# Patient Record
Sex: MALE | Race: WHITE | NOT HISPANIC OR LATINO | Employment: FULL TIME | URBAN - METROPOLITAN AREA
[De-identification: names, ages, dates, MRNs, and addresses within clinical notes are randomized per-mention and may not be internally consistent; named-entity substitution may affect disease eponyms.]

---

## 2018-02-01 ENCOUNTER — COMMUNICATION - HEALTHEAST (OUTPATIENT)
Dept: INTERNAL MEDICINE | Facility: CLINIC | Age: 38
End: 2018-02-01

## 2018-02-02 ENCOUNTER — OFFICE VISIT - HEALTHEAST (OUTPATIENT)
Dept: INTERNAL MEDICINE | Facility: CLINIC | Age: 38
End: 2018-02-02

## 2018-02-02 ENCOUNTER — AMBULATORY - HEALTHEAST (OUTPATIENT)
Dept: INTERNAL MEDICINE | Facility: CLINIC | Age: 38
End: 2018-02-02

## 2018-02-02 DIAGNOSIS — Z00.00 ROUTINE GENERAL MEDICAL EXAMINATION AT A HEALTH CARE FACILITY: ICD-10-CM

## 2018-02-02 DIAGNOSIS — R79.89 ABNORMAL LFTS: ICD-10-CM

## 2018-02-02 LAB
ALBUMIN SERPL-MCNC: 4.4 G/DL (ref 3.5–5)
ALBUMIN UR-MCNC: NEGATIVE MG/DL
ALP SERPL-CCNC: 44 U/L (ref 45–120)
ALT SERPL W P-5'-P-CCNC: 64 U/L (ref 0–45)
ANION GAP SERPL CALCULATED.3IONS-SCNC: 11 MMOL/L (ref 5–18)
APPEARANCE UR: CLEAR
AST SERPL W P-5'-P-CCNC: 144 U/L (ref 0–40)
BILIRUB SERPL-MCNC: 1.7 MG/DL (ref 0–1)
BILIRUB UR QL STRIP: NEGATIVE
BUN SERPL-MCNC: 11 MG/DL (ref 8–22)
CALCIUM SERPL-MCNC: 9.5 MG/DL (ref 8.5–10.5)
CHLORIDE BLD-SCNC: 104 MMOL/L (ref 98–107)
CHOLEST SERPL-MCNC: 149 MG/DL
CO2 SERPL-SCNC: 25 MMOL/L (ref 22–31)
COLOR UR AUTO: YELLOW
CREAT SERPL-MCNC: 0.96 MG/DL (ref 0.7–1.3)
ERYTHROCYTE [DISTWIDTH] IN BLOOD BY AUTOMATED COUNT: 12.2 % (ref 11–14.5)
FASTING STATUS PATIENT QL REPORTED: NORMAL
GFR SERPL CREATININE-BSD FRML MDRD: >60 ML/MIN/1.73M2
GLUCOSE BLD-MCNC: 92 MG/DL (ref 70–125)
GLUCOSE UR STRIP-MCNC: NEGATIVE MG/DL
HCT VFR BLD AUTO: 43 % (ref 40–54)
HDLC SERPL-MCNC: 83 MG/DL
HGB BLD-MCNC: 14.2 G/DL (ref 14–18)
HGB UR QL STRIP: NEGATIVE
KETONES UR STRIP-MCNC: NEGATIVE MG/DL
LDLC SERPL CALC-MCNC: 59 MG/DL
LEUKOCYTE ESTERASE UR QL STRIP: NEGATIVE
MCH RBC QN AUTO: 29.6 PG (ref 27–34)
MCHC RBC AUTO-ENTMCNC: 33 G/DL (ref 32–36)
MCV RBC AUTO: 90 FL (ref 80–100)
NITRATE UR QL: NEGATIVE
PH UR STRIP: 7.5 [PH] (ref 5–8)
PLATELET # BLD AUTO: 180 THOU/UL (ref 140–440)
PMV BLD AUTO: 7 FL (ref 7–10)
POTASSIUM BLD-SCNC: 4.2 MMOL/L (ref 3.5–5)
PROT SERPL-MCNC: 7.6 G/DL (ref 6–8)
PSA SERPL-MCNC: 0.8 NG/ML (ref 0–2.5)
RBC # BLD AUTO: 4.8 MILL/UL (ref 4.4–6.2)
SODIUM SERPL-SCNC: 140 MMOL/L (ref 136–145)
SP GR UR STRIP: 1.01 (ref 1–1.03)
TRIGL SERPL-MCNC: 37 MG/DL
TSH SERPL DL<=0.005 MIU/L-ACNC: 1.31 UIU/ML (ref 0.3–5)
UROBILINOGEN UR STRIP-ACNC: NORMAL
WBC: 5.3 THOU/UL (ref 4–11)

## 2018-02-02 ASSESSMENT — MIFFLIN-ST. JEOR: SCORE: 1766.49

## 2018-02-05 ENCOUNTER — COMMUNICATION - HEALTHEAST (OUTPATIENT)
Dept: INTERNAL MEDICINE | Facility: CLINIC | Age: 38
End: 2018-02-05

## 2018-02-06 ENCOUNTER — HOSPITAL ENCOUNTER (OUTPATIENT)
Dept: ULTRASOUND IMAGING | Facility: CLINIC | Age: 38
Discharge: HOME | End: 2018-02-06
Attending: INTERNAL MEDICINE

## 2018-02-06 DIAGNOSIS — R79.89 OTHER SPECIFIED ABNORMAL FINDINGS OF BLOOD CHEMISTRY: ICD-10-CM

## 2018-02-06 DIAGNOSIS — R79.89 ABNORMAL LFTS: ICD-10-CM

## 2018-02-27 ENCOUNTER — OFFICE VISIT - HEALTHEAST (OUTPATIENT)
Dept: INTERNAL MEDICINE | Facility: CLINIC | Age: 38
End: 2018-02-27

## 2018-02-27 DIAGNOSIS — R79.89 ABNORMAL LIVER FUNCTION TESTS: ICD-10-CM

## 2018-02-27 LAB
ALBUMIN SERPL-MCNC: 4.5 G/DL (ref 3.5–5)
ALP SERPL-CCNC: 46 U/L (ref 45–120)
ALT SERPL W P-5'-P-CCNC: 36 U/L (ref 0–45)
AST SERPL W P-5'-P-CCNC: 44 U/L (ref 0–40)
BILIRUB DIRECT SERPL-MCNC: 0.6 MG/DL
BILIRUB SERPL-MCNC: 2.4 MG/DL (ref 0–1)
PROT SERPL-MCNC: 7.5 G/DL (ref 6–8)

## 2018-02-27 ASSESSMENT — MIFFLIN-ST. JEOR: SCORE: 1766.49

## 2018-02-28 LAB
HAV IGM SERPL QL IA: NEGATIVE
HBV CORE IGM SERPL QL IA: NEGATIVE
HBV SURFACE AG SERPL QL IA: NEGATIVE
HCV AB SERPL QL IA: NEGATIVE

## 2018-03-01 ENCOUNTER — COMMUNICATION - HEALTHEAST (OUTPATIENT)
Dept: INTERNAL MEDICINE | Facility: CLINIC | Age: 38
End: 2018-03-01

## 2018-06-25 ENCOUNTER — OFFICE VISIT - HEALTHEAST (OUTPATIENT)
Dept: INTERNAL MEDICINE | Facility: CLINIC | Age: 38
End: 2018-06-25

## 2018-06-25 DIAGNOSIS — R79.89 ABNORMAL LIVER FUNCTION TESTS: ICD-10-CM

## 2018-06-25 LAB
ALBUMIN SERPL-MCNC: 4.3 G/DL (ref 3.5–5)
ALP SERPL-CCNC: 46 U/L (ref 45–120)
ALT SERPL W P-5'-P-CCNC: 14 U/L (ref 0–45)
AST SERPL W P-5'-P-CCNC: 16 U/L (ref 0–40)
BILIRUB DIRECT SERPL-MCNC: 0.5 MG/DL
BILIRUB SERPL-MCNC: 1.8 MG/DL (ref 0–1)
PROT SERPL-MCNC: 7.3 G/DL (ref 6–8)

## 2018-06-25 ASSESSMENT — MIFFLIN-ST. JEOR: SCORE: 1757.41

## 2018-06-26 ENCOUNTER — COMMUNICATION - HEALTHEAST (OUTPATIENT)
Dept: INTERNAL MEDICINE | Facility: CLINIC | Age: 38
End: 2018-06-26

## 2018-11-26 ENCOUNTER — RECORDS - HEALTHEAST (OUTPATIENT)
Dept: ADMINISTRATIVE | Facility: OTHER | Age: 38
End: 2018-11-26

## 2018-12-27 ENCOUNTER — OFFICE VISIT - HEALTHEAST (OUTPATIENT)
Dept: INTERNAL MEDICINE | Facility: CLINIC | Age: 38
End: 2018-12-27

## 2018-12-27 DIAGNOSIS — R10.84 ABDOMINAL PAIN, GENERALIZED: ICD-10-CM

## 2018-12-27 LAB
ALBUMIN SERPL-MCNC: 4.2 G/DL (ref 3.5–5)
ALBUMIN UR-MCNC: NEGATIVE MG/DL
ALP SERPL-CCNC: 56 U/L (ref 45–120)
ALT SERPL W P-5'-P-CCNC: 18 U/L (ref 0–45)
ANION GAP SERPL CALCULATED.3IONS-SCNC: 10 MMOL/L (ref 5–18)
APPEARANCE UR: CLEAR
AST SERPL W P-5'-P-CCNC: 16 U/L (ref 0–40)
BILIRUB DIRECT SERPL-MCNC: 0.2 MG/DL
BILIRUB SERPL-MCNC: 0.7 MG/DL (ref 0–1)
BILIRUB UR QL STRIP: NEGATIVE
BUN SERPL-MCNC: 15 MG/DL (ref 8–22)
CALCIUM SERPL-MCNC: 9.4 MG/DL (ref 8.5–10.5)
CHLORIDE BLD-SCNC: 106 MMOL/L (ref 98–107)
CO2 SERPL-SCNC: 27 MMOL/L (ref 22–31)
COLOR UR AUTO: YELLOW
CREAT SERPL-MCNC: 0.98 MG/DL (ref 0.7–1.3)
ERYTHROCYTE [DISTWIDTH] IN BLOOD BY AUTOMATED COUNT: 12.9 % (ref 11–14.5)
GFR SERPL CREATININE-BSD FRML MDRD: >60 ML/MIN/1.73M2
GLUCOSE BLD-MCNC: 92 MG/DL (ref 70–125)
GLUCOSE UR STRIP-MCNC: NEGATIVE MG/DL
HCT VFR BLD AUTO: 43.4 % (ref 40–54)
HGB BLD-MCNC: 14.5 G/DL (ref 14–18)
HGB UR QL STRIP: NEGATIVE
KETONES UR STRIP-MCNC: NEGATIVE MG/DL
LEUKOCYTE ESTERASE UR QL STRIP: NEGATIVE
LIPASE SERPL-CCNC: 15 U/L (ref 0–52)
MCH RBC QN AUTO: 30.1 PG (ref 27–34)
MCHC RBC AUTO-ENTMCNC: 33.4 G/DL (ref 32–36)
MCV RBC AUTO: 90 FL (ref 80–100)
NITRATE UR QL: NEGATIVE
PH UR STRIP: 5.5 [PH] (ref 5–8)
PLATELET # BLD AUTO: 238 THOU/UL (ref 140–440)
PMV BLD AUTO: 6.9 FL (ref 7–10)
POTASSIUM BLD-SCNC: 4.2 MMOL/L (ref 3.5–5)
PROT SERPL-MCNC: 7.1 G/DL (ref 6–8)
RBC # BLD AUTO: 4.83 MILL/UL (ref 4.4–6.2)
SODIUM SERPL-SCNC: 143 MMOL/L (ref 136–145)
SP GR UR STRIP: >=1.03 (ref 1–1.03)
UROBILINOGEN UR STRIP-ACNC: NORMAL
WBC: 5.3 THOU/UL (ref 4–11)

## 2018-12-27 ASSESSMENT — MIFFLIN-ST. JEOR: SCORE: 1766.49

## 2018-12-28 ENCOUNTER — HOSPITAL ENCOUNTER (OUTPATIENT)
Dept: CT IMAGING | Facility: CLINIC | Age: 38
Discharge: HOME | End: 2018-12-28
Attending: INTERNAL MEDICINE

## 2018-12-28 ENCOUNTER — COMMUNICATION - HEALTHEAST (OUTPATIENT)
Dept: INTERNAL MEDICINE | Facility: CLINIC | Age: 38
End: 2018-12-28

## 2018-12-28 DIAGNOSIS — R10.84 ABDOMINAL PAIN, GENERALIZED: ICD-10-CM

## 2019-01-18 ENCOUNTER — RECORDS - HEALTHEAST (OUTPATIENT)
Dept: ADMINISTRATIVE | Facility: OTHER | Age: 39
End: 2019-01-18

## 2019-01-25 ENCOUNTER — RECORDS - HEALTHEAST (OUTPATIENT)
Dept: ADMINISTRATIVE | Facility: OTHER | Age: 39
End: 2019-01-25

## 2019-04-08 ENCOUNTER — RECORDS - HEALTHEAST (OUTPATIENT)
Dept: ADMINISTRATIVE | Facility: OTHER | Age: 39
End: 2019-04-08

## 2019-04-25 ENCOUNTER — RECORDS - HEALTHEAST (OUTPATIENT)
Dept: ADMINISTRATIVE | Facility: OTHER | Age: 39
End: 2019-04-25

## 2021-06-01 VITALS — WEIGHT: 182 LBS | HEIGHT: 73 IN | BODY MASS INDEX: 24.12 KG/M2

## 2021-06-01 VITALS — BODY MASS INDEX: 24.12 KG/M2 | WEIGHT: 182 LBS | HEIGHT: 73 IN

## 2021-06-01 VITALS — HEIGHT: 73 IN | BODY MASS INDEX: 23.86 KG/M2 | WEIGHT: 180 LBS

## 2021-06-02 VITALS — BODY MASS INDEX: 24.12 KG/M2 | HEIGHT: 73 IN | WEIGHT: 182 LBS

## 2021-06-15 NOTE — PROGRESS NOTES
Office Visit - Physical    Hans Abel   37 y.o. male    Date of Visit: 2/2/2018    Chief Complaint   Patient presents with     Annual Exam     fasting     Cyst     back of neck     Nevus       Subjective: Physical.    37-year-old male here for physical exam.  Cyst on neck reassured suggest Derm consult nevus another skin exams reviewed check with dermatology.    ROS: A comprehensive review of systems was performed and was otherwise negative    Medications:   Prior to Admission medications    Medication Sig Start Date End Date Taking? Authorizing Provider   penicillin VK (PEN VK) 500 MG tablet TAKE 1 TABLET BY MOUTH 2 TIMES A DAY FOR 10 DAYS. 1/23/18   PROVIDER, HISTORICAL       Allergies:  Allergies   Allergen Reactions     Cat Hair Std Allergenic Ext      Pollen        Immunizations:   Immunization History   Administered Date(s) Administered     Td,adult,historic,unspecified 07/26/2012     Tdap 07/26/2012       Health Maintenance: Non-smoker no excess alcohol social averaging 2 drinks per night.  Allergic to cat hair no other known drug allergies.    Immunizations reviewed and up-to-date.    Past Medical History: None 2 courses of penicillin this month for strep infection.  In the throat  Past Surgical History: Ear tubes and wisdom teeth extractions    Family History: Mother and dad both living mother age 66 with polymyalgia rheumatica.  Father living age 69 with osteoarthritis.  One sister age 37 with 3 children herself the patient himself with his wife has 2 children.   twice  once.    Social History: [employer]  Enjoys playing golf recently played at [employer] with his family.  Exam Chest clear to auscultation and percussion.  Heart tones regular rhythm without murmur rub or gallop.  Abdomen soft nontender no organomegaly.  No peritoneal signs.  Extremities free of edema cyanosis or clubbing.  Neck veins nondistended no thyromegaly or scleral icterus noted, carotids full.  Skin warm  and dry easily conversant good spirited.  Normal intelligence.  Neurologically intact no gross localizing findings.  Skin negative lymph negative neuro negative psych normal HEENT negative back straight no severe spine tenderness general rectal exam negative good pulses noted in all 4 extremities no carotid bruits or thyromegaly.    Assessment and Plan  General medical examination at healthcare facility needs dermatology consult.  Check hemogram comprehensive metabolic profile lipid panel PSA TSH urinalysis.    Allergy to cat hair and pollen.    Recent 2 courses of penicillin by mouth for strep pharyngitis.        Edwardo Cannon MD    Patient Active Problem List   Diagnosis     Hyperlipidemia     Influenza     Muscle Aches, Generalized (Myalgias)     Fever (Symptom)

## 2021-06-16 NOTE — PROGRESS NOTES
Office Visit - Follow up    Hans Abel   37 y.o. male    Date of Visit: 2/27/2018    Chief Complaint   Patient presents with     Follow-up     f/u elevated liver enzymes       Subjective: Abnormal liver function tests.  Patient drinks alcohol every day.    The recent ultrasound right upper quadrant unremarkable.    Hepatitis B see an acute evaluation is underway with serologic testing.    The patient denies being jaundice or anorectic no nausea or vomiting.  No right upper quadrant tenderness or pain.  No ascitic fluid wave or leg edema.  Or palmar erythema.    No blood in stool or urine medication list reviewed well-tolerated normal effects.    ROS: A comprehensive review of systems was performed and was otherwise negative    Medications:  Prior to Admission medications    Medication Sig Start Date End Date Taking? Authorizing Provider   penicillin VK (PEN VK) 500 MG tablet TAKE 1 TABLET BY MOUTH 2 TIMES A DAY FOR 10 DAYS. 1/23/18 2/27/18  PROVIDER, HISTORICAL       Allergies:   Allergies   Allergen Reactions     Cat Hair Std Allergenic Ext      Pollen        Immunizations:   Immunization History   Administered Date(s) Administered     Td,adult,historic,unspecified 07/26/2012     Tdap 07/26/2012       Exam Chest clear to auscultation and percussion.  Heart tones regular rhythm without murmur rub or gallop.  Abdomen soft nontender no organomegaly.  No peritoneal signs.  Extremities free of edema cyanosis or clubbing.  Neck veins nondistended no thyromegaly or scleral icterus noted, carotids full.  Skin warm and dry easily conversant good spirited.  Normal intelligence.  Neurologically intact no gross localizing findings.    No ascitic fluid wave demonstrated no parotid swelling no palmar erythema or telangiectasias.      Assessment and Plan  Abnormal liver function tests uncertain etiology repeat hepatic profile plus hepatitis BC and acute serology evaluation.    Cat hair and pollen allergies.    Time: total  time spent with the patient was 15 minutes of which >50% was spent in counseling and coordination of care    The following high BMI interventions were performed this visit: encouragement to exercise    Edwardo Cannon MD    Patient Active Problem List   Diagnosis     Hyperlipidemia     Influenza     Muscle Aches, Generalized (Myalgias)     Fever (Symptom)

## 2021-06-18 NOTE — PROGRESS NOTES
Office Visit - Follow up    Hans Abel   38 y.o. male    Date of Visit: 6/25/2018    Chief Complaint   Patient presents with     Follow-up     liver function tests       Subjective: Abnormal liver function tests.    Alcohol intake is quantitatively less and qualitatively different.  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    And less of it totally.    No abdominal pain no fluid in the abdomen or legs.  She he does not notice any dark cola colored urine or kindra colored stools.    No blood in stool or urine medication list reviewed on a supplement to aid liver.    Allergy cat hair and pollen.  Seasonal.    One child had strep infection 2 weeks ago.  Wonders if he is a carrier.  Offered rapid strep test of the throat for this patient he declined.    ROS: A comprehensive review of systems was performed and was otherwise negative    Medications:  Prior to Admission medications    Not on File       Allergies:   Allergies   Allergen Reactions     Cat Hair Std Allergenic Ext      Pollen        Immunizations:   Immunization History   Administered Date(s) Administered     Td,adult,historic,unspecified 07/26/2012     Tdap 07/26/2012       Exam Chest clear to auscultation and percussion.  Heart tones regular rhythm without murmur rub or gallop.  Abdomen soft nontender no organomegaly.  No peritoneal signs.  Extremities free of edema cyanosis or clubbing.  Neck veins nondistended no thyromegaly or scleral icterus noted, carotids full.  Skin warm and dry easily conversant good spirited.  Normal intelligence.  Neurologically intact no gross localizing findings.  Throat negative minimal redness no exudate no anterior posterior cervical adenopathy no thyromegaly.  He appeared well nonicteric.  Easily conversant.  Prior history of elevated bilirubin level noted.  May not be related to abnormal liver function tests related to alcohol.  May be  Gilbert's syndrome?    Assessment and Plan  Gilbert syndrome with abnormal liver function tests; check hepatic profile today.  Seasonal allergies cat hair and pollen.    Alcohol intake may be excess discussed.  Encourage patient to use less alcohol.    Hyperlipidemia by history no history of target organ damage related to same that is no history of MI or stroke.    Family history recently positive for strep infection oropharynx minimally red no exudate no anterior posterior cervical adenopathy offered rapid strep test of the throat patient declined.    Time: total time spent with the patient was 25 minutes of which >50% was spent in counseling and coordination of care        Edwardo Cannon MD    Patient Active Problem List   Diagnosis     Hyperlipidemia     Influenza     Muscle Aches, Generalized (Myalgias)     Fever (Symptom)

## 2021-06-22 NOTE — PROGRESS NOTES
Office Visit - Follow up    Hans Abel   38 y.o. male    Date of Visit: 12/27/2018    Chief Complaint   Patient presents with     Follow-up     abnormal liver function tests     Pain     right abdomen and right lower back off and on for one month     Gas       Subjective: Abdominal pain.    Right side of abdomen left side of abdomen feels like there is bubbling in his bowel.    Gas and stool was pasty for 2 weeks follow-up abnormal liver profile as well.  Prior history of excess alcohol intake.    Symptoms started December 1, 2018.  Change in bowel movement change in consistency better with Metamucil.  The patient had emesis the patient had a stomach flu with diarrhea.  Wonder about irritable bowel syndrome.    No blood in stool or urine weight is stable.  No nocturnal diarrhea dictation list reviewed well-tolerated normal effects.    ROS: A comprehensive review of systems was performed and was otherwise negative    Medications:  Prior to Admission medications    Medication Sig Start Date End Date Taking? Authorizing Provider   psyllium with dextrose (METAMUCIL JAR) powder Take by mouth 3 (three) times a day.   Yes PROVIDER, HISTORICAL       Allergies:   Allergies   Allergen Reactions     Cat Hair Std Allergenic Ext      Pollen        Immunizations:   Immunization History   Administered Date(s) Administered     Td,adult,historic,unspecified 07/26/2012     Tdap 07/26/2012       Exam Chest clear to auscultation and percussion.  Heart tones regular rhythm without murmur rub or gallop.  Abdomen soft nontender no organomegaly.  No peritoneal signs.  Extremities free of edema cyanosis or clubbing.  Neck veins nondistended no thyromegaly or scleral icterus noted, carotids full.  Skin warm and dry easily conversant good spirited.  Normal intelligence.  Neurologically intact no gross localizing findings.  Not in acute distress easily conversant BMI is ideal at 24.  Weight is up 2 pounds 182.  116/68 pulse 74  respirations 18 O2 sats 100% on room air.    Allergies cat hair and pollen no regular meds.    Assessment and Plan  Abdominal pain uncertain etiology check hemogram plus hepatic profile basic metabolic profile serum lipase urinalysis with urine culture as well as CT of abdomen.    Irritable bowel syndrome.    Viral gastroenteritis better Metamucil helping.    Gilbert syndrome as recent examination through insurance company showed elevated bilirubin level this was explained in detail the difference between unconjugated and conjugated bilirubin as a breakdown product of seem and lack of enzymes with Gilbert syndrome producing conjugated bilirubin for dissolution and water and excretion    Time: total time spent with the patient was 40 minutes of which >50% was spent in counseling and coordination of care    The following high BMI interventions were performed this visit: encouragement to exercise    Edwardo Cannon MD    Patient Active Problem List   Diagnosis     Hyperlipidemia     Influenza     Muscle Aches, Generalized (Myalgias)     Fever (Symptom)

## 2021-06-26 ENCOUNTER — HEALTH MAINTENANCE LETTER (OUTPATIENT)
Age: 41
End: 2021-06-26

## 2021-10-04 ENCOUNTER — TELEPHONE (OUTPATIENT)
Dept: INTERNAL MEDICINE | Facility: CLINIC | Age: 41
End: 2021-10-04

## 2021-10-04 NOTE — TELEPHONE ENCOUNTER
Reason for Call:  Patient calling and wondering which Covid vaccine PCP would recommend he get.  Patient would like to discuss with nurse or PCP    Phone Number Patient can be reached at: Cell number on file:    Telephone Information:   Mobile [phone]       Best Time: anytime    Can we leave a detailed message on this number? YES    Call taken on 10/4/2021 at 10:40 AM by Nicolasa Lawrence

## 2021-10-04 NOTE — TELEPHONE ENCOUNTER
Spoke with patient and he said he will try and text Dr. Cannon. Advised him I would send this message incase he doesn't get a hold of him.   Emma Ortiz CSS

## 2021-10-16 ENCOUNTER — HEALTH MAINTENANCE LETTER (OUTPATIENT)
Age: 41
End: 2021-10-16

## 2021-12-28 ENCOUNTER — OFFICE VISIT (OUTPATIENT)
Dept: INTERNAL MEDICINE | Facility: CLINIC | Age: 41
End: 2021-12-28
Payer: COMMERCIAL

## 2021-12-28 VITALS
SYSTOLIC BLOOD PRESSURE: 122 MMHG | OXYGEN SATURATION: 99 % | BODY MASS INDEX: 25.05 KG/M2 | HEART RATE: 55 BPM | WEIGHT: 189 LBS | DIASTOLIC BLOOD PRESSURE: 76 MMHG | HEIGHT: 73 IN

## 2021-12-28 DIAGNOSIS — Z00.00 ROUTINE GENERAL MEDICAL EXAMINATION AT A HEALTH CARE FACILITY: Primary | ICD-10-CM

## 2021-12-28 DIAGNOSIS — R31.29 MICROSCOPIC HEMATURIA: Primary | ICD-10-CM

## 2021-12-28 LAB
ALBUMIN SERPL-MCNC: 4.3 G/DL (ref 3.5–5)
ALBUMIN UR-MCNC: NEGATIVE MG/DL
ALP SERPL-CCNC: 47 U/L (ref 45–120)
ALT SERPL W P-5'-P-CCNC: 19 U/L (ref 0–45)
ANION GAP SERPL CALCULATED.3IONS-SCNC: 9 MMOL/L (ref 5–18)
APPEARANCE UR: CLEAR
AST SERPL W P-5'-P-CCNC: 19 U/L (ref 0–40)
BACTERIA #/AREA URNS HPF: ABNORMAL /HPF
BILIRUB SERPL-MCNC: 1.1 MG/DL (ref 0–1)
BILIRUB UR QL STRIP: NEGATIVE
BUN SERPL-MCNC: 17 MG/DL (ref 8–22)
CALCIUM SERPL-MCNC: 9.3 MG/DL (ref 8.5–10.5)
CHLORIDE BLD-SCNC: 105 MMOL/L (ref 98–107)
CHOLEST SERPL-MCNC: 162 MG/DL
CO2 SERPL-SCNC: 28 MMOL/L (ref 22–31)
COLOR UR AUTO: YELLOW
CREAT SERPL-MCNC: 1.11 MG/DL (ref 0.7–1.3)
ERYTHROCYTE [DISTWIDTH] IN BLOOD BY AUTOMATED COUNT: 12.5 % (ref 10–15)
FASTING STATUS PATIENT QL REPORTED: YES
GFR SERPL CREATININE-BSD FRML MDRD: 84 ML/MIN/1.73M2
GLUCOSE BLD-MCNC: 98 MG/DL (ref 70–125)
GLUCOSE UR STRIP-MCNC: NEGATIVE MG/DL
HCT VFR BLD AUTO: 42.1 % (ref 40–53)
HDLC SERPL-MCNC: 61 MG/DL
HGB BLD-MCNC: 13.7 G/DL (ref 13.3–17.7)
HGB UR QL STRIP: ABNORMAL
KETONES UR STRIP-MCNC: NEGATIVE MG/DL
LDLC SERPL CALC-MCNC: 88 MG/DL
LEUKOCYTE ESTERASE UR QL STRIP: NEGATIVE
MCH RBC QN AUTO: 29.2 PG (ref 26.5–33)
MCHC RBC AUTO-ENTMCNC: 32.5 G/DL (ref 31.5–36.5)
MCV RBC AUTO: 90 FL (ref 78–100)
MUCOUS THREADS #/AREA URNS LPF: PRESENT /LPF
NITRATE UR QL: NEGATIVE
PH UR STRIP: 5.5 [PH] (ref 5–8)
PLATELET # BLD AUTO: 186 10E3/UL (ref 150–450)
POTASSIUM BLD-SCNC: 4.2 MMOL/L (ref 3.5–5)
PROT SERPL-MCNC: 7.1 G/DL (ref 6–8)
PSA SERPL-MCNC: 0.81 UG/L (ref 0–2.5)
RBC # BLD AUTO: 4.69 10E6/UL (ref 4.4–5.9)
RBC #/AREA URNS AUTO: ABNORMAL /HPF
SODIUM SERPL-SCNC: 142 MMOL/L (ref 136–145)
SP GR UR STRIP: >=1.03 (ref 1–1.03)
SQUAMOUS #/AREA URNS AUTO: ABNORMAL /LPF
TRIGL SERPL-MCNC: 67 MG/DL
TSH SERPL DL<=0.005 MIU/L-ACNC: 2.15 UIU/ML (ref 0.3–5)
UROBILINOGEN UR STRIP-ACNC: 0.2 E.U./DL
WBC # BLD AUTO: 4 10E3/UL (ref 4–11)
WBC #/AREA URNS AUTO: ABNORMAL /HPF

## 2021-12-28 PROCEDURE — 99386 PREV VISIT NEW AGE 40-64: CPT | Performed by: INTERNAL MEDICINE

## 2021-12-28 PROCEDURE — 80050 GENERAL HEALTH PANEL: CPT | Performed by: INTERNAL MEDICINE

## 2021-12-28 PROCEDURE — G0103 PSA SCREENING: HCPCS | Performed by: INTERNAL MEDICINE

## 2021-12-28 PROCEDURE — 81001 URINALYSIS AUTO W/SCOPE: CPT | Performed by: INTERNAL MEDICINE

## 2021-12-28 PROCEDURE — 80061 LIPID PANEL: CPT | Performed by: INTERNAL MEDICINE

## 2021-12-28 PROCEDURE — 36415 COLL VENOUS BLD VENIPUNCTURE: CPT | Performed by: INTERNAL MEDICINE

## 2021-12-28 ASSESSMENT — MIFFLIN-ST. JEOR: SCORE: 1802.21

## 2021-12-28 NOTE — PROGRESS NOTES
SUBJECTIVE:   CC: Hans Abel is an 41 year old male who presents for preventative health visit.     [id number]}  Patient has been advised of split billing requirements and indicates understanding: Yes  Healthy Habits:     Getting at least 3 servings of Calcium per day:  NO    Bi-annual eye exam:  NO    Dental care twice a year:  NO    Sleep apnea or symptoms of sleep apnea:  Daytime drowsiness    Diet:  Regular (no restrictions)    Frequency of exercise:  1 day/week    Duration of exercise:  Less than 15 minutes    Taking medications regularly:  Not Applicable    Medication side effects:  Not applicable    PHQ-2 Total Score: 0    Additional concerns today:  Yes      {Outside tests to abstract? :439264}    {additional problems to add (Optional):205277}    Today's PHQ-2 Score:   PHQ-2 ( 1999 Pfizer) 12/27/2021   Q1: Little interest or pleasure in doing things 0   Q2: Feeling down, depressed or hopeless 0   PHQ-2 Score 0   Q1: Little interest or pleasure in doing things Not at all   Q2: Feeling down, depressed or hopeless Not at all   PHQ-2 Score 0       Abuse: Current or Past(Physical, Sexual or Emotional)- { :431580}  Do you feel safe in your environment? { :206355}    Have you ever done Advance Care Planning? (For example, a Health Directive, POLST, or a discussion with a medical provider or your loved ones about your wishes): { :316199}    Social History     Tobacco Use     Smoking status: Never Smoker     Smokeless tobacco: Never Used   Substance Use Topics     Alcohol use: Yes     Alcohol/week: 6.0 standard drinks     {Rooming Staff- Complete this question if Prescreen response is not shown below for today's visit. If you drink alcohol do you typically have >3 drinks per day or >7 drinks per week? (Optional):203545}    Alcohol Use 12/27/2021   Prescreen: >3 drinks/day or >7 drinks/week? Yes   AUDIT SCORE  9   {add AUDIT responses (Optional) (A score of 7 for adult men is an indication of hazardous drinking; a  "score of 8 or more is an indication of an alcohol use disorder.  A score of 7 or more for adult women is an indication of hazardous drinking or an alchohol use disorder):876813}    Last PSA:   Prostate Specific Antigen Screen   Date Value Ref Range Status   02/02/2018 0.8 0.0 - 2.5 ng/mL Final       Reviewed orders with patient. Reviewed health maintenance and updated orders accordingly - { :794311::\"Yes\"}  {Chronicprobdata (optional):590889}    Reviewed and updated as needed this visit by clinical staff  Tobacco  Allergies              Reviewed and updated as needed this visit by Provider               {HISTORY OPTIONS (Optional):744071}    Review of Systems  {MALE ROS (Optional):987087::\"CONSTITUTIONAL: NEGATIVE for fever, chills, change in weight\",\"INTEGUMENTARY/SKIN: NEGATIVE for worrisome rashes, moles or lesions\",\"EYES: NEGATIVE for vision changes or irritation\",\"ENT: NEGATIVE for ear, mouth and throat problems\",\"RESP: NEGATIVE for significant cough or SOB\",\"CV: NEGATIVE for chest pain, palpitations or peripheral edema\",\"GI: NEGATIVE for nausea, abdominal pain, heartburn, or change in bowel habits\",\" male: negative for dysuria, hematuria, decreased urinary stream, erectile dysfunction, urethral discharge\",\"MUSCULOSKELETAL: NEGATIVE for significant arthralgias or myalgia\",\"NEURO: NEGATIVE for weakness, dizziness or paresthesias\",\"PSYCHIATRIC: NEGATIVE for changes in mood or affect\"}    OBJECTIVE:   /76 (BP Location: Right arm, Patient Position: Sitting)   Pulse 55   Ht 1.848 m (6' 0.75\")   Wt 85.7 kg (189 lb)   SpO2 99%   BMI 25.11 kg/m      Physical Exam  {Exam Choices (Optional):593161}    {Diagnostic Test Results (Optional):944237::\"Diagnostic Test Results:\",\"Labs reviewed in Epic\"}    ASSESSMENT/PLAN:   {Diag Picklist:392984}    Patient has been advised of split billing requirements and indicates understanding: {YES / NO:638363::\"Yes\"}  COUNSELING:   {MALE COUNSELING " "MESSAGES:426594::\"Reviewed preventive health counseling, as reflected in patient instructions\"}    Estimated body mass index is 25.11 kg/m  as calculated from the following:    Height as of this encounter: 1.848 m (6' 0.75\").    Weight as of this encounter: 85.7 kg (189 lb).     {Weight Management Plan (ACO) Complete if BMI is abnormal-  Ages 18-64  BMI >24.9.  Age 65+ with BMI <23 or >30 (Optional):103519}    He reports that he has never smoked. He has never used smokeless tobacco.      Counseling Resources:  ATP IV Guidelines  Pooled Cohorts Equation Calculator  FRAX Risk Assessment  ICSI Preventive Guidelines  Dietary Guidelines for Americans, 2010  USDA's MyPlate  ASA Prophylaxis  Lung CA Screening    Edwardo Cannon MD  [facility]  "

## 2021-12-28 NOTE — PROGRESS NOTES
Office Visit - Physical    Hans Abel   41 year old male    Date of Visit: 12/28/2021    Chief Complaint   Patient presents with     Physical     fasting       Subjective: Physical examination is.  41-year-old male here for physical examination.  Non-smoker alcohol intake is social plus allergies none.    Uses Metamucil on occasion.  Has had a colonoscopy 2 or 3 years ago negative.    No other serious illnesses other than in April 2021 he had Covid illness the patient was advised to get the Covid vaccine the patient has yet to do so.  The patient's wife is well as are his 2 children colonoscopy date is April 25, 2019 allCLEAR.  Patient has had some discomfort along his right rib cage and lower back likely musculoskeletal.  Enjoys playing golf.    ROS: A comprehensive review of systems was performed and was otherwise negative    Medications:   Prior to Admission medications    Medication Sig Start Date End Date Taking? Authorizing Provider   psyllium with dextrose (METAMUCIL JAR) powder Take by mouth daily  12/27/18  Yes Provider, Historical       Allergies:  Allergies   Allergen Reactions     Cat Hair Std Allergenic Ext [Cat Hair Extract] Unknown     Pollen [Pollen Extract] Unknown       Immunizations:   Immunization History   Administered Date(s) Administered     Td,adult,historic,unspecified 07/26/2012     Tdap (Adacel,Boostrix) 07/26/2012       Health Maintenance: Immunizations are yet to be completed the patient has had the usual childhood vaccines but has yet had the Covid vaccine I did recommend that he receive it.  In April 2021 for the COVID-19 illness.    Past Medical History: Previously used Metamucil.    Past Surgical History: Colonoscopy dated April 25, 2019 with Dr. Thomas newell [facility].    Family History: Mother and father both living in their early 70s.  Mother with polymyalgia rheumatica father with arthritis generalized and uveitis.  2 children well wife well.   twice   Prescription sent to the patient's pharmacy.   once.    Social History: Works in [employer].    Exam Chest clear to auscultation and percussion.  Heart tones regular rhythm without murmur rub or gallop.  Abdomen soft nontender no organomegaly.  No peritoneal signs.  Extremities free of edema cyanosis or clubbing.  Neck veins nondistended no thyromegaly or scleral icterus noted, carotids full.  Skin warm and dry easily conversant good spirited.  Normal intelligence.  Neurologically intact no gross localizing findings.  Rest of examination was negative in its entirety genital rectal exam normal prostate small lymph negative neuro negative skin negative HEENT negative wearing a mask easily conversant good spirited highly intelligent not in acute distress not toxic.    Assessment and Plan  General medical examination at health care facility discussed habits in detail today check hemogram comprehensive metabolic profile urinalysis lipid panel PSA TSH.        Edwardo Cannon MD, MD    Patient Active Problem List   Diagnosis     Hyperlipidemia     Influenza     Muscle Aches, Generalized (Myalgias)     Fever (Symptom)     Answers for HPI/ROS submitted by the patient on 12/27/2021  Frequency of exercise:: 1 day/week  Getting at least 3 servings of Calcium per day:: NO  Diet:: Regular (no restrictions)  Taking medications regularly:: Not Applicable  Medication side effects:: Not applicable  Bi-annual eye exam:: NO  Dental care twice a year:: NO  Sleep apnea or symptoms of sleep apnea:: Daytime drowsiness  Additional concerns today:: Yes  Duration of exercise:: Less than 15 minutes

## 2021-12-28 NOTE — LETTER
December 30, 2021      Hans Abel  [address]  [place] [address]        Dear DezColten,    We are writing to inform you of your test results.    All very good!    Resulted Orders   CBC with platelets   Result Value Ref Range    WBC Count 4.0 4.0 - 11.0 10e3/uL    RBC Count 4.69 4.40 - 5.90 10e6/uL    Hemoglobin 13.7 13.3 - 17.7 g/dL    Hematocrit 42.1 40.0 - 53.0 %    MCV 90 78 - 100 fL    MCH 29.2 26.5 - 33.0 pg    MCHC 32.5 31.5 - 36.5 g/dL    RDW 12.5 10.0 - 15.0 %    Platelet Count 186 150 - 450 10e3/uL   Comprehensive metabolic panel   Result Value Ref Range    Sodium 142 136 - 145 mmol/L    Potassium 4.2 3.5 - 5.0 mmol/L    Chloride 105 98 - 107 mmol/L    Carbon Dioxide (CO2) 28 22 - 31 mmol/L    Anion Gap 9 5 - 18 mmol/L    Urea Nitrogen 17 8 - 22 mg/dL    Creatinine 1.11 0.70 - 1.30 mg/dL    Calcium 9.3 8.5 - 10.5 mg/dL    Glucose 98 70 - 125 mg/dL    Alkaline Phosphatase 47 45 - 120 U/L    AST 19 0 - 40 U/L    ALT 19 0 - 45 U/L    Protein Total 7.1 6.0 - 8.0 g/dL    Albumin 4.3 3.5 - 5.0 g/dL    Bilirubin Total 1.1 (H) 0.0 - 1.0 mg/dL    GFR Estimate 84 >60 mL/min/1.73m2      Comment:      Effective December 21, 2021 eGFRcr in adults is calculated using the 2021 CKD-EPI creatinine equation which includes age and gender (Harman et al., NEJM, DOI: 10.1056/XOWHcg0215348)   Prostate Specific Antigen Screen   Result Value Ref Range    Prostate Specific Antigen Screen 0.81 0.00 - 2.50 ug/L    Narrative    Assay Method is Abbott Prostate-Specific Antigen (PSA)  Standard-WHO 1st International (90:10)   TSH   Result Value Ref Range    TSH 2.15 0.30 - 5.00 uIU/mL   UA reflex to Microscopic and Culture   Result Value Ref Range    Color Urine Yellow Colorless, Straw, Light Yellow, Yellow    Appearance Urine Clear Clear    Glucose Urine Negative Negative mg/dL    Bilirubin Urine Negative Negative    Ketones Urine Negative Negative mg/dL    Specific Gravity Urine >=1.030 1.005 - 1.030    Blood  Urine Trace (A) Negative    pH Urine 5.5 5.0 - 8.0    Protein Albumin Urine Negative Negative mg/dL    Urobilinogen Urine 0.2 0.2, 1.0 E.U./dL    Nitrite Urine Negative Negative    Leukocyte Esterase Urine Negative Negative   Lipid panel reflex to direct LDL Fasting   Result Value Ref Range    Cholesterol 162 <=199 mg/dL    Triglycerides 67 <=149 mg/dL    Direct Measure HDL 61 >=40 mg/dL      Comment:      HDL Cholesterol Reference Range:     0-2 years:   No reference ranges established for patients under 2 years old  at [facility] for lipid analytes.    2-8 years:  Greater than 45 mg/dL     18 years and older:   Female: Greater than or equal to 50 mg/dL   Male:   Greater than or equal to 40 mg/dL    LDL Cholesterol Calculated 88 <=129 mg/dL    Patient Fasting > 8hrs? Yes    Urine Microscopic   Result Value Ref Range    Bacteria Urine None Seen None Seen /HPF    RBC Urine 2-5 (A) 0-2 /HPF /HPF    WBC Urine None Seen 0-5 /HPF /HPF    Squamous Epithelials Urine Few (A) None Seen /LPF    Mucus Urine Present (A) None Seen /LPF    Narrative    Urine Culture not indicated       If you have any questions or concerns, please call the clinic at the number listed above.       Sincerely,      Edwardo Cannon MD

## 2022-02-08 ENCOUNTER — TRANSFERRED RECORDS (OUTPATIENT)
Dept: HEALTH INFORMATION MANAGEMENT | Facility: CLINIC | Age: 42
End: 2022-02-08
Payer: COMMERCIAL

## 2022-07-23 ENCOUNTER — HEALTH MAINTENANCE LETTER (OUTPATIENT)
Age: 42
End: 2022-07-23

## 2022-08-05 ENCOUNTER — TRANSFERRED RECORDS (OUTPATIENT)
Dept: HEALTH INFORMATION MANAGEMENT | Facility: CLINIC | Age: 42
End: 2022-08-05

## 2022-10-01 ENCOUNTER — HEALTH MAINTENANCE LETTER (OUTPATIENT)
Age: 42
End: 2022-10-01

## 2022-12-15 ENCOUNTER — TRANSFERRED RECORDS (OUTPATIENT)
Dept: HEALTH INFORMATION MANAGEMENT | Facility: CLINIC | Age: 42
End: 2022-12-15

## 2022-12-29 ENCOUNTER — TRANSFERRED RECORDS (OUTPATIENT)
Dept: HEALTH INFORMATION MANAGEMENT | Facility: CLINIC | Age: 42
End: 2022-12-29

## 2023-01-09 ENCOUNTER — OFFICE VISIT (OUTPATIENT)
Dept: INTERNAL MEDICINE | Facility: CLINIC | Age: 43
End: 2023-01-09
Payer: COMMERCIAL

## 2023-01-09 ENCOUNTER — NURSE TRIAGE (OUTPATIENT)
Dept: NURSING | Facility: CLINIC | Age: 43
End: 2023-01-09

## 2023-01-09 VITALS
TEMPERATURE: 98.3 F | DIASTOLIC BLOOD PRESSURE: 85 MMHG | OXYGEN SATURATION: 99 % | SYSTOLIC BLOOD PRESSURE: 135 MMHG | HEART RATE: 68 BPM | BODY MASS INDEX: 25.73 KG/M2 | HEIGHT: 72 IN | WEIGHT: 190 LBS

## 2023-01-09 DIAGNOSIS — Z00.00 ROUTINE GENERAL MEDICAL EXAMINATION AT A HEALTH CARE FACILITY: Primary | ICD-10-CM

## 2023-01-09 LAB
ALBUMIN SERPL BCG-MCNC: 4.9 G/DL (ref 3.5–5.2)
ALBUMIN UR-MCNC: NEGATIVE MG/DL
ALP SERPL-CCNC: 61 U/L (ref 40–129)
ALT SERPL W P-5'-P-CCNC: 17 U/L (ref 10–50)
ANION GAP SERPL CALCULATED.3IONS-SCNC: 14 MMOL/L (ref 7–15)
APPEARANCE UR: CLEAR
AST SERPL W P-5'-P-CCNC: 22 U/L (ref 10–50)
BILIRUB SERPL-MCNC: 1.2 MG/DL
BILIRUB UR QL STRIP: NEGATIVE
BUN SERPL-MCNC: 13.6 MG/DL (ref 6–20)
CALCIUM SERPL-MCNC: 9.2 MG/DL (ref 8.6–10)
CHLORIDE SERPL-SCNC: 104 MMOL/L (ref 98–107)
CHOLEST SERPL-MCNC: 186 MG/DL
COLOR UR AUTO: YELLOW
CREAT SERPL-MCNC: 1.1 MG/DL (ref 0.67–1.17)
DEPRECATED HCO3 PLAS-SCNC: 24 MMOL/L (ref 22–29)
ERYTHROCYTE [DISTWIDTH] IN BLOOD BY AUTOMATED COUNT: 12.3 % (ref 10–15)
ERYTHROCYTE [SEDIMENTATION RATE] IN BLOOD BY WESTERGREN METHOD: 7 MM/HR (ref 0–15)
GFR SERPL CREATININE-BSD FRML MDRD: 85 ML/MIN/1.73M2
GLUCOSE SERPL-MCNC: 89 MG/DL (ref 70–99)
GLUCOSE UR STRIP-MCNC: NEGATIVE MG/DL
HCT VFR BLD AUTO: 42.6 % (ref 40–53)
HDLC SERPL-MCNC: 66 MG/DL
HGB BLD-MCNC: 14.4 G/DL (ref 13.3–17.7)
HGB UR QL STRIP: NEGATIVE
KETONES UR STRIP-MCNC: NEGATIVE MG/DL
LDLC SERPL CALC-MCNC: 95 MG/DL
LEUKOCYTE ESTERASE UR QL STRIP: NEGATIVE
LIPASE SERPL-CCNC: 24 U/L (ref 13–60)
MCH RBC QN AUTO: 29.8 PG (ref 26.5–33)
MCHC RBC AUTO-ENTMCNC: 33.8 G/DL (ref 31.5–36.5)
MCV RBC AUTO: 88 FL (ref 78–100)
NITRATE UR QL: NEGATIVE
NONHDLC SERPL-MCNC: 120 MG/DL
PH UR STRIP: 6 [PH] (ref 5–8)
PLATELET # BLD AUTO: 225 10E3/UL (ref 150–450)
POTASSIUM SERPL-SCNC: 4.1 MMOL/L (ref 3.4–5.3)
PROT SERPL-MCNC: 7.6 G/DL (ref 6.4–8.3)
PSA SERPL-MCNC: 0.88 NG/ML (ref 0–2.5)
RBC # BLD AUTO: 4.84 10E6/UL (ref 4.4–5.9)
SODIUM SERPL-SCNC: 142 MMOL/L (ref 136–145)
SP GR UR STRIP: <=1.005 (ref 1–1.03)
TRIGL SERPL-MCNC: 127 MG/DL
TSH SERPL DL<=0.005 MIU/L-ACNC: 2.83 UIU/ML (ref 0.3–4.2)
UROBILINOGEN UR STRIP-ACNC: 0.2 E.U./DL
WBC # BLD AUTO: 5.9 10E3/UL (ref 4–11)

## 2023-01-09 PROCEDURE — 99396 PREV VISIT EST AGE 40-64: CPT | Performed by: INTERNAL MEDICINE

## 2023-01-09 PROCEDURE — 85027 COMPLETE CBC AUTOMATED: CPT | Performed by: INTERNAL MEDICINE

## 2023-01-09 PROCEDURE — 81003 URINALYSIS AUTO W/O SCOPE: CPT | Performed by: INTERNAL MEDICINE

## 2023-01-09 PROCEDURE — G0103 PSA SCREENING: HCPCS | Performed by: INTERNAL MEDICINE

## 2023-01-09 PROCEDURE — 85652 RBC SED RATE AUTOMATED: CPT | Performed by: INTERNAL MEDICINE

## 2023-01-09 PROCEDURE — 80061 LIPID PANEL: CPT | Performed by: INTERNAL MEDICINE

## 2023-01-09 PROCEDURE — 36415 COLL VENOUS BLD VENIPUNCTURE: CPT | Performed by: INTERNAL MEDICINE

## 2023-01-09 PROCEDURE — 80053 COMPREHEN METABOLIC PANEL: CPT | Performed by: INTERNAL MEDICINE

## 2023-01-09 PROCEDURE — 84443 ASSAY THYROID STIM HORMONE: CPT | Performed by: INTERNAL MEDICINE

## 2023-01-09 PROCEDURE — 83690 ASSAY OF LIPASE: CPT | Performed by: INTERNAL MEDICINE

## 2023-01-09 ASSESSMENT — ENCOUNTER SYMPTOMS
HEMATOCHEZIA: 0
CONSTIPATION: 1
ABDOMINAL PAIN: 1
COUGH: 0
HEADACHES: 0
NAUSEA: 0
CHILLS: 0
ARTHRALGIAS: 0
HEARTBURN: 0
WEAKNESS: 0
EYE PAIN: 0
DYSURIA: 0
PARESTHESIAS: 0
MYALGIAS: 0
FREQUENCY: 0
DIZZINESS: 0
SORE THROAT: 0
HEMATURIA: 0
DIARRHEA: 1
NERVOUS/ANXIOUS: 0
FEVER: 0
PALPITATIONS: 0
SHORTNESS OF BREATH: 0
JOINT SWELLING: 0

## 2023-01-09 ASSESSMENT — PAIN SCALES - GENERAL: PAINLEVEL: SEVERE PAIN (6)

## 2023-01-09 NOTE — PROGRESS NOTES
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  Right flank discomfort recent MRI scan done results pending MRI was whole body.  Right rib cage is sore worse after using excess alcohol on Metamucil recent colonoscopy allCLEAR patient thinks it may be irritable bowel syndrome prior history of using Gas-X over-the-counter with benefit.    ROS: A comprehensive review of systems was performed and was otherwise negative    Medications:   Prior to Admission medications    Medication Sig Start Date End Date Taking? Authorizing Provider   psyllium with dextrose (METAMUCIL JAR) powder Take by mouth daily  12/27/18  Yes Provider, Historical       Allergies:  Allergies   Allergen Reactions     Cat Hair Std Allergenic Ext [Cat Hair Extract] Unknown     Pollen Extract Unknown       Immunizations:   Immunization History   Administered Date(s) Administered     Td (Adult), Adsorbed 02/24/2009     Td,adult,historic,unspecified 07/26/2012     Tdap (Adacel,Boostrix) 07/26/2012       Health Maintenance: Non-smoker social alcohol allergies none.  Recent colonoscopy allCLEAR.  Recent MRI scan whole body results pending.    Past Medical History: COVID-19 illness x2 unvaccinated.  Back pain as well.    Past Surgical History: None    Family History: Parents both living enjoyed good health Father is 74 mother is 71 sister well 2 children well 13 and 11 wife well.    Social History: Successful sales.  1 a trip to [place] to [facility].  Headed to [facility] next week.    Exam easily conversant good spirited not in acute distress not toxic neatly attired.  Well groomed.  Vital signs stable afebrile blood pressure is normal.    Skin negative lymph negative neuro negative head eyes ears nose throat grossly intact back straight no severe spine tenderness chest clear  heart tones normal abdomen benign genital examination was negative testicular exam normal no groin hernias scrotal contents normal rectal showed a small prostate without nodularity induration rest the rectal exam negative.    Assessment and Plan  General medical examination for 42-year-old male who appears well excellent neuromuscular tone and strength good core muscles.  Today check hemogram comprehensive metabolic profile urinalysis lipid panel PSA TSH also sedimentation rate and serum lipase will be done.  I asked the patient to share the results of an MRI scan whole body that was done at an outside facility within the last couple of weeks.  Colonoscopy is done recently and all clear.  We did discuss irritable bowel syndrome and the need for fluids exercise and Metamucil.        Edwardo Cannon MD    Patient Active Problem List   Diagnosis     Hyperlipidemia     Influenza     Muscle Aches, Generalized (Myalgias)     Fever (Symptom)     Answers for HPI/ROS submitted by the patient on 1/9/2023  Frequency of exercise:: 2-3 days/week  Getting at least 3 servings of Calcium per day:: Yes  Diet:: Breakfast skipped  Taking medications regularly:: Yes  Medication side effects:: Not applicable  Bi-annual eye exam:: NO  Dental care twice a year:: NO  Sleep apnea or symptoms of sleep apnea:: Daytime drowsiness  abdominal pain: Yes  Blood in stool: No  Blood in urine: No  chest pain: No  chills: No  congestion: No  constipation: Yes  cough: No  diarrhea: Yes  dizziness: No  ear pain: No  eye pain: No  nervous/anxious: No  fever: No  frequency: No  genital sores: No  headaches: No  hearing loss: No  heartburn: No  arthralgias: No  joint swelling: No  peripheral edema: No  mood changes: No  myalgias: No  nausea: No  dysuria: No  palpitations: No  Skin sensation changes: No  sore throat: No  urgency: No  rash: No  shortness of breath: No  visual disturbance: Yes  weakness: No  impotence: No  penile discharge: No  Additional  concerns today:: Yes  Duration of exercise:: 45-60 minutes

## 2023-01-09 NOTE — PROGRESS NOTES
SUBJECTIVE:   CC: Hans is an 42 year old who presents for preventative health visit.  Pt also complaining about right flank area, and  Radiating to back.     Patient has been advised of split billing requirements and indicates understanding: Yes     Healthy Habits:     Getting at least 3 servings of Calcium per day:  Yes    Bi-annual eye exam:  NO    Dental care twice a year:  NO    Sleep apnea or symptoms of sleep apnea:  Daytime drowsiness    Diet:  Breakfast skipped    Frequency of exercise:  2-3 days/week    Duration of exercise:  45-60 minutes    Taking medications regularly:  Yes    Medication side effects:  Not applicable    PHQ-2 Total Score: 0    Additional concerns today:  Yes      {Outside tests to abstract? :762939}    {additional problems to add (Optional):342218}    Today's PHQ-2 Score:   PHQ-2 ( 1999 [employer]) 1/9/2023   Q1: Little interest or pleasure in doing things 0   Q2: Feeling down, depressed or hopeless 0   PHQ-2 Score 0   Q1: Little interest or pleasure in doing things Not at all   Q2: Feeling down, depressed or hopeless Not at all   PHQ-2 Score 0       Have you ever done Advance Care Planning? (For example, a Health Directive, POLST, or a discussion with a medical provider or your loved ones about your wishes): Yes, patient states has an Advance Care Planning document and will bring a copy to the clinic.    Social History     Tobacco Use     Smoking status: Never     Smokeless tobacco: Never   Substance Use Topics     Alcohol use: Yes     Alcohol/week: 17.0 standard drinks     Types: 14 Glasses of wine, 3 Standard drinks or equivalent per week     If you drink alcohol do you typically have >3 drinks per day or >7 drinks per week? Yes  {Complete AUDIT in Assessments section of the Visit Navigator and Refresh}    Alcohol Use 1/9/2023   Prescreen: >3 drinks/day or >7 drinks/week? Yes   AUDIT SCORE  10   {add AUDIT responses (Optional) (A score of 7 for adult men is an indication of hazardous  "drinking; a score of 8 or more is an indication of an alcohol use disorder.  A score of 7 or more for adult women is an indication of hazardous drinking or an alchohol use disorder):999319}    Last PSA:   Prostate Specific Antigen Screen   Date Value Ref Range Status   12/28/2021 0.81 0.00 - 2.50 ug/L Final       Reviewed orders with patient. Reviewed health maintenance and updated orders accordingly - { :547811::\"Yes\"}  {Chronicprobdata (optional):868750}    Reviewed and updated as needed this visit by clinical staff                  Reviewed and updated as needed this visit by Provider                 {HISTORY OPTIONS (Optional):332667}    Review of Systems   Constitutional: Negative for chills and fever.   HENT: Negative for congestion, ear pain, hearing loss and sore throat.    Eyes: Positive for visual disturbance. Negative for pain.   Respiratory: Negative for cough and shortness of breath.    Cardiovascular: Negative for chest pain, palpitations and peripheral edema.   Gastrointestinal: Positive for abdominal pain, constipation and diarrhea. Negative for heartburn, hematochezia and nausea.   Genitourinary: Negative for dysuria, frequency, genital sores, hematuria, impotence, penile discharge and urgency.   Musculoskeletal: Negative for arthralgias, joint swelling and myalgias.   Skin: Negative for rash.   Neurological: Negative for dizziness, weakness, headaches and paresthesias.   Psychiatric/Behavioral: Negative for mood changes. The patient is not nervous/anxious.      {MALE ROS (Optional):210653::\"CONSTITUTIONAL: NEGATIVE for fever, chills, change in weight\",\"INTEGUMENTARY/SKIN: NEGATIVE for worrisome rashes, moles or lesions\",\"EYES: NEGATIVE for vision changes or irritation\",\"ENT: NEGATIVE for ear, mouth and throat problems\",\"RESP: NEGATIVE for significant cough or SOB\",\"CV: NEGATIVE for chest pain, palpitations or peripheral edema\",\"GI: NEGATIVE for nausea, abdominal pain, heartburn, or change in bowel " "habits\",\" male: negative for dysuria, hematuria, decreased urinary stream, erectile dysfunction, urethral discharge\",\"MUSCULOSKELETAL: NEGATIVE for significant arthralgias or myalgia\",\"NEURO: NEGATIVE for weakness, dizziness or paresthesias\",\"PSYCHIATRIC: NEGATIVE for changes in mood or affect\"}    OBJECTIVE:   There were no vitals taken for this visit.    Physical Exam  {Exam Choices (Optional):323858}    {Diagnostic Test Results (Optional):685508::\"Diagnostic Test Results:\",\"Labs reviewed in Epic\"}    ASSESSMENT/PLAN:   {Diag Picklist:646264}    {Patient advised of split billing (Optional):401558}      COUNSELING:   {MALE COUNSELING MESSAGES:469287::\"Reviewed preventive health counseling, as reflected in patient instructions\"}        He reports that he has never smoked. He has never used smokeless tobacco.      {Counseling Resources  [place] Preventive Services Task Force  Cholesterol Screening  Health diet/nutrition  Pooled Cohorts Equation Calculator  USDA's MyPlate  ASA Prophylaxis  Lung CA Screening  Osteoporosis prevention/bone health :858868}  {Prostate Cancer Screening  Consider for men 55-69 per guidance from USPSTF :108068}    Edwardo Cannon MD  [facility]  "

## 2023-01-09 NOTE — TELEPHONE ENCOUNTER
Nurse Triage SBAR    Is this a 2nd Level Triage? YES, LICENSED PRACTITIONER REVIEW IS REQUIRED    Situation:   Spoke with 42 yr old Hans who has a physical scheduled for 4:00 pm today with Dr. Cannon.  Pt asking if there is any fasting requirement for lab work today?    Background:  No pending lab orders in the chart.    Assessment:   Question for PCP/team.    Protocol Recommended Disposition:   Discuss with PCP and callback by nurse within 1 hour.    Recommendation:   Will message PCP/team per second level triage/protocol.    Routed to provider    Does the patient meet one of the following criteria for ADS visit consideration? 16+ years old, with an [facility] PCP     TIP  Providers, please consider if this condition is appropriate for management at one of our Acute and Diagnostic Services sites.     If patient is a good candidate, please use dotphrase <dot>triageresponse and select Refer to ADS to document.    Mandy Lovell RN  [facility]      Reason for Disposition    Nursing judgment    Additional Information    Negative: Nursing judgment    Protocols used: INFORMATION ONLY CALL - NO TRIAGE-A-OH

## 2023-08-06 ENCOUNTER — HEALTH MAINTENANCE LETTER (OUTPATIENT)
Age: 43
End: 2023-08-06

## 2023-08-22 ENCOUNTER — HOSPITAL ENCOUNTER (OUTPATIENT)
Dept: CT IMAGING | Facility: HOSPITAL | Age: 43
Discharge: HOME | End: 2023-08-22
Attending: INTERNAL MEDICINE | Admitting: INTERNAL MEDICINE
Payer: COMMERCIAL

## 2023-08-22 ENCOUNTER — OFFICE VISIT (OUTPATIENT)
Dept: INTERNAL MEDICINE | Facility: CLINIC | Age: 43
End: 2023-08-22
Payer: COMMERCIAL

## 2023-08-22 VITALS
DIASTOLIC BLOOD PRESSURE: 84 MMHG | HEART RATE: 54 BPM | HEIGHT: 73 IN | OXYGEN SATURATION: 100 % | BODY MASS INDEX: 25.14 KG/M2 | RESPIRATION RATE: 20 BRPM | WEIGHT: 189.7 LBS | TEMPERATURE: 98.7 F | SYSTOLIC BLOOD PRESSURE: 130 MMHG

## 2023-08-22 DIAGNOSIS — R91.8 PULMONARY NODULES: Primary | ICD-10-CM

## 2023-08-22 DIAGNOSIS — R16.0 LIVER MASS: Primary | ICD-10-CM

## 2023-08-22 DIAGNOSIS — R91.8 PULMONARY NODULES: ICD-10-CM

## 2023-08-22 PROCEDURE — 99213 OFFICE O/P EST LOW 20 MIN: CPT | Performed by: INTERNAL MEDICINE

## 2023-08-22 PROCEDURE — 71250 CT THORAX DX C-: CPT

## 2023-08-22 ASSESSMENT — PAIN SCALES - GENERAL: PAINLEVEL: NO PAIN (0)

## 2023-08-22 NOTE — PROGRESS NOTES
"  {PROVIDER CHARTING PREFERENCE:474363}    Subjective   Prasanna is a 43 year old, presenting for the following health issues:  Follow Up (MRI of lungs from [facility]) and Results (Scan can be found: Chart Review>Media>3/28/2023)        8/22/2023     6:54 AM   Additional Questions   Roomed by LC Patrick   Accompanied by MIKEY       History of Present Illness       Reason for visit:  [facility]    Prasanna Abel eats 0-1 servings of fruits and vegetables daily.Prasanna Abel consumes 1 sweetened beverage(s) daily.Prasanna Abel exercises with enough effort to increase Prasanna Abel's heart rate 30 to 60 minutes per day.  Prasanna Abel exercises with enough effort to increase Prasanna Abel's heart rate 3 or less days per week.   Prasanna Abel is taking medications regularly.       {SUPERLIST (Optional):313888}  {additonal problems for provider to add (Optional):319707}      Review of Systems   {ROS COMP (Optional):584796}      Objective    /84 (BP Location: Right arm, Patient Position: Sitting, Cuff Size: Adult Regular)   Pulse 54   Temp 98.7  F (37.1  C) (Oral)   Resp 20   Ht 1.854 m (6' 1\")   Wt 86 kg (189 lb 11.2 oz)   SpO2 100%   BMI 25.03 kg/m    Body mass index is 25.03 kg/m .  Physical Exam   {Exam List (Optional):945466}    {Diagnostic Test Results (Optional):448412}    {AMBULATORY ATTESTATION (Optional):170276}              "

## 2023-08-22 NOTE — PROGRESS NOTES
"Assessment/Plan:    Pulmonary nodule possible.  Left chest.  Recent MRI scan done total body showed a suggestion of atelectasis or pulmonary nodule left lung base patient is asymptomatic.  Radiologist from 12/28/2022 suggested follow-up in 3 months time.  Patient has no constitutional symptoms of weight loss hemoptysis fever or chills no history of positive beverly test or serious lung infections.  The patient is asymptomatic he is a non-smoker.  CT can of the chest ordered follow-up pulmonary nodule left lung base.  Rule out atelectasis.  We had a good discussion.    15 minutes spent on the date of the encounter doing chart review, patient visit, and documentation     Subjective:  Hans Abel is a 43 year old adult presents for the following health issues asymptomatic possible pulmonary nodule left lung base.  Reviewed MRI scan from 12/28/2022 a total body MRI scan.  Patient is asymptomatic no cough no hemoptysis no fever.  He did have COVID-19 illness twice.  No history of necrotizing pneumonia.  The patient is a non-smoker.  No weight loss.  He feels well.  43-year-old .    ROS:  No blood in stool urine or sputum med list reviewed reconciled.  Weight stable.  He feels well.    Objective:  /84 (BP Location: Right arm, Patient Position: Sitting, Cuff Size: Adult Regular)   Pulse 54   Temp 98.7  F (37.1  C) (Oral)   Resp 20   Ht 1.854 m (6' 1\")   Wt 86 kg (189 lb 11.2 oz)   SpO2 100%   BMI 25.03 kg/m    No lymphadenopathy appreciated lymph bearing areas chest was clear posteriorly anteriorly no rales rhonchi or wheezes heart tones normal neck veins were nondistended no thyromegaly no carotid bruits abdomen was benign skin was normal extremities are free of edema cyanosis or clubbing he appeared well neuromuscular tone is good he is strong.  Healthy-appearing good coloring.    Edwardo Cannon MD  Internal Medicine  Answers submitted by the patient for this visit:  General " Questionnaire (Submitted on 8/22/2023)  Chief Complaint: Chronic problems general questions HPI Form  What is the reason for your visit today? : Follow up to MRI from "[facility] 3dim  How many servings of fruits and vegetables do you eat daily?: 0-1  On average, how many sweetened beverages do you drink each day (Examples: soda, juice, sweet tea, etc.  Do NOT count diet or artificially sweetened beverages)?: 1  How many minutes a day do you exercise enough to make your heart beat faster?: 30 to 60  How many days a week do you exercise enough to make your heart beat faster?: 3 or less  How many days per week do you miss taking your medication?: 0

## 2023-09-06 ENCOUNTER — HOSPITAL ENCOUNTER (OUTPATIENT)
Dept: MRI IMAGING | Facility: HOSPITAL | Age: 43
Discharge: HOME | End: 2023-09-06
Attending: INTERNAL MEDICINE | Admitting: INTERNAL MEDICINE
Payer: COMMERCIAL

## 2023-09-06 DIAGNOSIS — R16.0 LIVER MASS: ICD-10-CM

## 2023-09-06 PROCEDURE — A9585 GADOBUTROL INJECTION: HCPCS | Performed by: INTERNAL MEDICINE

## 2023-09-06 PROCEDURE — 74183 MRI ABD W/O CNTR FLWD CNTR: CPT

## 2023-09-06 PROCEDURE — 255N000002 HC RX 255 OP 636: Performed by: INTERNAL MEDICINE

## 2023-09-06 RX ORDER — GADOBUTROL 604.72 MG/ML
0.1 INJECTION INTRAVENOUS ONCE
Status: COMPLETED | OUTPATIENT
Start: 2023-09-06 | End: 2023-09-06

## 2023-09-06 RX ADMIN — GADOBUTROL 9 ML: 604.72 INJECTION INTRAVENOUS at 12:56

## 2024-09-28 ENCOUNTER — HEALTH MAINTENANCE LETTER (OUTPATIENT)
Age: 44
End: 2024-09-28

## 2025-06-04 SDOH — HEALTH STABILITY: PHYSICAL HEALTH: ON AVERAGE, HOW MANY MINUTES DO YOU ENGAGE IN EXERCISE AT THIS LEVEL?: 30 MIN

## 2025-06-04 SDOH — HEALTH STABILITY: PHYSICAL HEALTH: ON AVERAGE, HOW MANY DAYS PER WEEK DO YOU ENGAGE IN MODERATE TO STRENUOUS EXERCISE (LIKE A BRISK WALK)?: 3 DAYS

## 2025-06-04 ASSESSMENT — SOCIAL DETERMINANTS OF HEALTH (SDOH): HOW OFTEN DO YOU GET TOGETHER WITH FRIENDS OR RELATIVES?: TWICE A WEEK

## 2025-06-09 ENCOUNTER — RESULTS FOLLOW-UP (OUTPATIENT)
Dept: INTERNAL MEDICINE | Facility: CLINIC | Age: 45
End: 2025-06-09

## 2025-06-09 ENCOUNTER — OFFICE VISIT (OUTPATIENT)
Dept: INTERNAL MEDICINE | Facility: CLINIC | Age: 45
End: 2025-06-09
Payer: COMMERCIAL

## 2025-06-09 VITALS
OXYGEN SATURATION: 97 % | DIASTOLIC BLOOD PRESSURE: 78 MMHG | TEMPERATURE: 97.8 F | HEART RATE: 56 BPM | BODY MASS INDEX: 26.64 KG/M2 | RESPIRATION RATE: 12 BRPM | HEIGHT: 72 IN | SYSTOLIC BLOOD PRESSURE: 124 MMHG | WEIGHT: 196.7 LBS

## 2025-06-09 DIAGNOSIS — K58.9 IRRITABLE BOWEL SYNDROME WITHOUT DIARRHEA: ICD-10-CM

## 2025-06-09 DIAGNOSIS — Z00.00 ROUTINE GENERAL MEDICAL EXAMINATION AT A HEALTH CARE FACILITY: Primary | ICD-10-CM

## 2025-06-09 LAB
ALBUMIN SERPL BCG-MCNC: 4.6 G/DL (ref 3.5–5.2)
ALBUMIN UR-MCNC: NEGATIVE MG/DL
ALP SERPL-CCNC: 52 U/L (ref 40–150)
ALT SERPL W P-5'-P-CCNC: 20 U/L (ref 0–70)
ANION GAP SERPL CALCULATED.3IONS-SCNC: 10 MMOL/L (ref 7–15)
APPEARANCE UR: CLEAR
AST SERPL W P-5'-P-CCNC: 23 U/L (ref 0–45)
BILIRUB SERPL-MCNC: 0.5 MG/DL
BILIRUB UR QL STRIP: NEGATIVE
BUN SERPL-MCNC: 13.7 MG/DL (ref 6–20)
CALCIUM SERPL-MCNC: 9.4 MG/DL (ref 8.8–10.4)
CHLORIDE SERPL-SCNC: 102 MMOL/L (ref 98–107)
CHOLEST SERPL-MCNC: 157 MG/DL
COLOR UR AUTO: YELLOW
CREAT SERPL-MCNC: 1.15 MG/DL (ref 0.51–1.17)
EGFRCR SERPLBLD CKD-EPI 2021: 79 ML/MIN/1.73M2
ERYTHROCYTE [DISTWIDTH] IN BLOOD BY AUTOMATED COUNT: 12.8 % (ref 10–15)
FASTING STATUS PATIENT QL REPORTED: YES
FASTING STATUS PATIENT QL REPORTED: YES
GLUCOSE SERPL-MCNC: 102 MG/DL (ref 70–99)
GLUCOSE UR STRIP-MCNC: NEGATIVE MG/DL
HCO3 SERPL-SCNC: 26 MMOL/L (ref 22–29)
HCT VFR BLD AUTO: 39.2 % (ref 35–53)
HDLC SERPL-MCNC: 67 MG/DL
HGB BLD-MCNC: 13 G/DL (ref 11.7–17.7)
HGB UR QL STRIP: NEGATIVE
KETONES UR STRIP-MCNC: NEGATIVE MG/DL
LDLC SERPL CALC-MCNC: 80 MG/DL
LEUKOCYTE ESTERASE UR QL STRIP: NEGATIVE
MCH RBC QN AUTO: 29.3 PG (ref 26.5–33)
MCHC RBC AUTO-ENTMCNC: 33.2 G/DL (ref 31.5–36.5)
MCV RBC AUTO: 88 FL (ref 78–100)
NITRATE UR QL: NEGATIVE
NONHDLC SERPL-MCNC: 90 MG/DL
PH UR STRIP: 6.5 [PH] (ref 5–8)
PLATELET # BLD AUTO: 190 10E3/UL (ref 150–450)
POTASSIUM SERPL-SCNC: 4 MMOL/L (ref 3.4–5.3)
PROT SERPL-MCNC: 7.1 G/DL (ref 6.4–8.3)
PSA SERPL DL<=0.01 NG/ML-MCNC: 0.84 NG/ML (ref 0–2.5)
RBC # BLD AUTO: 4.44 10E6/UL (ref 3.8–5.9)
SODIUM SERPL-SCNC: 138 MMOL/L (ref 135–145)
SP GR UR STRIP: <=1.005 (ref 1–1.03)
TRIGL SERPL-MCNC: 52 MG/DL
TSH SERPL DL<=0.005 MIU/L-ACNC: 2.58 UIU/ML (ref 0.3–4.2)
UROBILINOGEN UR STRIP-ACNC: 0.2 E.U./DL
WBC # BLD AUTO: 4 10E3/UL (ref 4–11)

## 2025-06-09 PROCEDURE — 3074F SYST BP LT 130 MM HG: CPT | Performed by: INTERNAL MEDICINE

## 2025-06-09 PROCEDURE — 84443 ASSAY THYROID STIM HORMONE: CPT | Performed by: INTERNAL MEDICINE

## 2025-06-09 PROCEDURE — 36415 COLL VENOUS BLD VENIPUNCTURE: CPT | Performed by: INTERNAL MEDICINE

## 2025-06-09 PROCEDURE — 80061 LIPID PANEL: CPT | Performed by: INTERNAL MEDICINE

## 2025-06-09 PROCEDURE — 99396 PREV VISIT EST AGE 40-64: CPT | Performed by: INTERNAL MEDICINE

## 2025-06-09 PROCEDURE — G0103 PSA SCREENING: HCPCS | Performed by: INTERNAL MEDICINE

## 2025-06-09 PROCEDURE — 80053 COMPREHEN METABOLIC PANEL: CPT | Performed by: INTERNAL MEDICINE

## 2025-06-09 PROCEDURE — 1126F AMNT PAIN NOTED NONE PRSNT: CPT | Performed by: INTERNAL MEDICINE

## 2025-06-09 PROCEDURE — 81003 URINALYSIS AUTO W/O SCOPE: CPT | Performed by: INTERNAL MEDICINE

## 2025-06-09 PROCEDURE — 3078F DIAST BP <80 MM HG: CPT | Performed by: INTERNAL MEDICINE

## 2025-06-09 PROCEDURE — 85027 COMPLETE CBC AUTOMATED: CPT | Performed by: INTERNAL MEDICINE

## 2025-06-09 ASSESSMENT — PAIN SCALES - GENERAL: PAINLEVEL_OUTOF10: NO PAIN (0)

## 2025-06-09 NOTE — PROGRESS NOTES
Office Visit - Physical    Hans Abel   45 year old adult    Date of Visit: 6/9/2025    Chief Complaint   Patient presents with    Physical       Subjective: Physical examination preventive health care visit for this 45-year-old male with symptoms of IBS.  Colonoscopy ordered with colorectal surgery group Dr. Tawny Cummings presiding.    Impaired vision suggest Dr. Mike Arias [facility] for ophthalmologic exam.  Non-smoker social alcohol 10 to 14/week usually wine.  Rare hard liquor.  Allergies none drug allergies.  Ear tubes.  History of elevated AST levels.  Resolved.  Sensitive to foods.    ROS: A comprehensive review of systems was performed and was otherwise negative    Medications:   Prior to Admission medications    Medication Sig Start Date End Date Taking? Authorizing Provider   psyllium with dextrose (METAMUCIL JAR) powder Take by mouth daily  12/27/18  Yes Provider, Historical       Allergies:  Allergies   Allergen Reactions    Cat Hair Std Allergenic Ext [Cat Dander] Unknown    Pollen Extract Unknown       Immunizations:   Immunization History   Administered Date(s) Administered    TDAP (Adacel,Boostrix) 07/26/2012    Td (Adult), Adsorbed 02/24/2009    Td,adult,historic,unspecified 07/26/2012       Health Maintenance: Immunizations vaccines reviewed and up-to-date.  Colonoscopy recommended.    Past Medical History: History of elevated AST levels.  May be related to fatty liver.    Past Surgical History: Ear tubes in his youth.    Family History: Parents both living in their early 70s mother at age 73 father 77.  Father with autoimmune type arthritis and iritis.  Mother with treated polymyalgia rheumatica.    2 children ages 15 and 13.  Wife well.  Children well.    Social History: .  Enjoys his work.  Enjoys golfing.    Exam easily conversant highly intelligent not in acute distress skin negative lymph negative neuro negative benign-appearing mole right chest  breast area reassurance given lymph negative neuro negative psych normal back straight chest clear heart tones normal abdomen benign mild left lower quadrant tenderness colonoscopy suggested.  Symptoms of IBS noted.  No weight loss.  Neck veins nondistended no thyromegaly no thyroid nodules no carotid bruits genital exam negative testicular exam normal scrotal contents normal no groin hernias left or right the rectal showed normal findings normal size prostate without nodularity induration brown stool present no rectal masses extremities are free of edema cyanosis or clubbing distal pulses all 4 extremities are strong and intact.    We had a very good examination today and a very good discussion.    Assessment and Plan  (Z00.00) Routine general medical examination at a health care facility  (primary encounter diagnosis)  Comment: Medical examination was excellent today.  On recheck his blood pressure was 124/78.  Initially 140/90.  Plan: CBC with platelets, Comprehensive metabolic         panel, Lipid panel reflex to direct LDL         Fasting, UA Macroscopic with reflex to         Microscopic and Culture, TSH, Prostate Specific        Antigen Screen        Emphasize importance of regular exercise salt restriction.  BMI is near ideal at 27.    (K58.9) Irritable bowel syndrome without diarrhea  Comment: On Metamucil.  Symptoms consistent with irritable bowel syndrome.  Plan: Lower abdominal pain with symptoms of irritable bowel syndrome no weight loss.  Age 45 recommend colonoscopy.            Edwardo Cannon MD   Internal Medicine        Patient Active Problem List   Diagnosis    Hyperlipidemia    Influenza    Muscle Aches, Generalized (Myalgias)    Fever (Symptom)